# Patient Record
Sex: MALE
[De-identification: names, ages, dates, MRNs, and addresses within clinical notes are randomized per-mention and may not be internally consistent; named-entity substitution may affect disease eponyms.]

---

## 2022-07-11 PROBLEM — Z00.00 ENCOUNTER FOR PREVENTIVE HEALTH EXAMINATION: Status: ACTIVE | Noted: 2022-07-11

## 2022-07-14 ENCOUNTER — APPOINTMENT (OUTPATIENT)
Dept: SURGERY | Facility: CLINIC | Age: 47
End: 2022-07-14

## 2022-07-14 VITALS
DIASTOLIC BLOOD PRESSURE: 87 MMHG | SYSTOLIC BLOOD PRESSURE: 137 MMHG | WEIGHT: 222 LBS | HEART RATE: 82 BPM | HEIGHT: 63 IN | BODY MASS INDEX: 39.34 KG/M2

## 2022-07-14 VITALS — HEIGHT: 63.39 IN | BODY MASS INDEX: 27.47 KG/M2

## 2022-07-14 VITALS
HEIGHT: 63.39 IN | DIASTOLIC BLOOD PRESSURE: 73 MMHG | HEART RATE: 76 BPM | BODY MASS INDEX: 26.25 KG/M2 | SYSTOLIC BLOOD PRESSURE: 109 MMHG | WEIGHT: 150 LBS

## 2022-07-14 VITALS — WEIGHT: 157 LBS | BODY MASS INDEX: 27.81 KG/M2

## 2022-07-14 DIAGNOSIS — Z78.9 OTHER SPECIFIED HEALTH STATUS: ICD-10-CM

## 2022-07-14 PROCEDURE — 99203 OFFICE O/P NEW LOW 30 MIN: CPT

## 2022-07-14 RX ORDER — KETOCONAZOLE 20.5 MG/ML
2 SHAMPOO, SUSPENSION TOPICAL
Qty: 120 | Refills: 0 | Status: ACTIVE | COMMUNITY
Start: 2021-11-22

## 2022-07-14 RX ORDER — HYDROCORTISONE 25 MG/G
2.5 CREAM TOPICAL
Qty: 60 | Refills: 0 | Status: ACTIVE | COMMUNITY
Start: 2022-06-06

## 2022-07-14 RX ORDER — OMEPRAZOLE 40 MG/1
40 CAPSULE, DELAYED RELEASE ORAL
Qty: 30 | Refills: 0 | Status: ACTIVE | COMMUNITY
Start: 2022-01-24

## 2022-07-14 NOTE — PHYSICAL EXAM
[None] : there were no anal fissures seen [Normal] : was normal [___ Anterior] : a [unfilled] ~Ucm rectal mass was present anteriorly [6:00] : in the 6:00 position [Alert] : alert [Oriented to Person] : oriented to person [Oriented to Place] : oriented to place [Oriented to Time] : oriented to time [Calm] : calm [de-identified] : He  is alert, well-groomed, and in NAD\par   [de-identified] : anicteric.  Nasal mucosa pink, septum midline. Oral mucosa pink.  Tongue midline, Pharynx without exudates.\par   [de-identified] : Neck supple. Trachea midline. Thyroid isthmus barely palpable, lobes not felt.\par

## 2022-07-14 NOTE — CONSULT LETTER
[Dear  ___] : Dear  [unfilled], [Consult Letter:] : I had the pleasure of evaluating your patient, [unfilled]. [Please see my note below.] : Please see my note below. [Consult Closing:] : Thank you very much for allowing me to participate in the care of this patient.  If you have any questions, please do not hesitate to contact me. [Sincerely,] : Sincerely, [FreeTextEntry3] : Torin Ram MD, FACS

## 2022-07-14 NOTE — PLAN
[FreeTextEntry1] : Mr. SIMENTAL  was told significance of findings, options, risks and benefits were explained.  Informed consent for sigmoidoscopy and excision anal mass and potential risks, benefits and alternatives (surgical options were discussed including non-surgical options or the option of no surgery) to the planned surgery were discussed in depth.  All surgical options were discussed including non-surgical treatments.  He wishes to proceed with surgery.  We will plan for surgery on at the next available date, pending any required insurance pre-certification or pre-approval. He agrees to obtain any necessary pre-operative evaluations and testing prior to surgery.\par Patient advised to seek immediate medical attention with any acute change in symptoms or with the development of any new or worsening symptoms.  Patient's questions and concerns addressed to patient's satisfaction, and patient verbalized an understanding of the information discussed.\par \par

## 2022-07-14 NOTE — HISTORY OF PRESENT ILLNESS
[de-identified] : Mr. JORGE SIMENTAL  is 47 year  old  male referred by Dr Enrique Barnes with the chief complaint having  anal polyp.     Mr. SIMENTAL denies rectal pain, swelling, drainage or bleeding   from the rectum. He reports  any nausea, vomiting or fevers.   Patient reports good   appetite. Patient denies  weight loss or fatigue.  patient reports  good bowel movements .  A colonoscopy was  performed on 06/06/2022 and  revealed  anal polyp. Patient's pathology results were  consistent with condyloma with moderated squamous dysplasia . Mr. SIMENTAL  denies family history of Colon Ca.

## 2022-08-11 ENCOUNTER — APPOINTMENT (OUTPATIENT)
Dept: SURGERY | Facility: CLINIC | Age: 47
End: 2022-08-11

## 2022-08-11 VITALS
DIASTOLIC BLOOD PRESSURE: 68 MMHG | HEART RATE: 69 BPM | HEIGHT: 63 IN | WEIGHT: 152 LBS | SYSTOLIC BLOOD PRESSURE: 101 MMHG | BODY MASS INDEX: 26.93 KG/M2

## 2022-08-11 VITALS — TEMPERATURE: 97.1 F

## 2022-08-11 PROCEDURE — 99213 OFFICE O/P EST LOW 20 MIN: CPT | Mod: 25

## 2022-08-11 PROCEDURE — 45171 EXC RECT TUM TRANSANAL PART: CPT

## 2022-08-11 NOTE — ASSESSMENT
[FreeTextEntry1] : Mr. JORGE SIMENTAL is 47 year old male referred by Dr Enrique Barnes with the chief complaint having anal polyp.   A colonoscopy was performed on 06/06/2022 and revealed anal polyp. Patient's pathology results were consistent with condyloma with moderated squamous dysplasia.

## 2022-08-11 NOTE — PROCEDURE
[FreeTextEntry1] : Mr. SIMENTAL  was told significance of findings, options, risks and benefits were explained.  Informed consent for sigmoidoscopy, excision anal lesion and potential risks, benefits and alternatives (surgical options were discussed including non-surgical options or the option of no surgery) to the planned surgery were discussed in depth.  All surgical options were discussed including non-surgical treatments.  He wishes to proceed with surgery.   \par \par Procedure\par \par Preoperative diagnosis :  anal lesion \par Post operative diagnosis : same\par Procedure : excision   \par \par The area was cleaned and prepped. Lesion  was identified at the left anal verge. The lesion was excised.  stitches were applied and no bleeding observed.    Tolerated the procedure. Post op instructions given\par Specimen sent for pathology \par Patient advised to seek immediate medical attention with any acute change in symptoms or with the development of any new or worsening symptoms.  Patient's questions and concerns addressed to patient's satisfaction, and patient verbalized an understanding of the information discussed.\par \par

## 2022-08-16 PROBLEM — K62.0 ANAL POLYP: Status: ACTIVE | Noted: 2022-07-14

## 2022-08-18 ENCOUNTER — APPOINTMENT (OUTPATIENT)
Dept: SURGERY | Facility: CLINIC | Age: 47
End: 2022-08-18

## 2022-08-18 VITALS
HEART RATE: 68 BPM | BODY MASS INDEX: 26.75 KG/M2 | SYSTOLIC BLOOD PRESSURE: 113 MMHG | DIASTOLIC BLOOD PRESSURE: 73 MMHG | HEIGHT: 63 IN | WEIGHT: 151 LBS

## 2022-08-18 DIAGNOSIS — K62.0 ANAL POLYP: ICD-10-CM

## 2022-08-18 LAB — CORE LAB BIOPSY: NORMAL

## 2022-08-18 PROCEDURE — 99024 POSTOP FOLLOW-UP VISIT: CPT

## 2022-08-18 NOTE — DATA REVIEWED
[FreeTextEntry1] :  Patient:   JORGE SIMENTAL\par \par \par Accession:                             [id number]\par \par Collected Date/Time:                   8/11/2022 13:37 EDT\par Received Date/Time:                    8/12/2022 11:18 EDT\par \par Surgical Pathology Report - Auth (Verified)\par \par Specimen(s) Submitted\par 1  Anal lesion biopsy\par \par Final Diagnosis\par 1. Anal lesion, biopsy\par      - Condyloma acuminatum.\par      - Negative for high grade dysplasia and carcinoma.\par \par Verified by: Jordan Lau DO\par (Electronic Signature)\par Reported on: 08/16/22 08:02 EDT,  [facility], [address]\[place]  [place] [address]\par Phone: [phone]   Fax: [phone]\par _________________________________________________________________\par \par Clinical Information\par \par K62.0\par \par Gross Description\par Received in formalin labeled   "Anal lision"  is a 0.8 cm in greatest\par  dimension tan tag-like soft tissue fragment. Ink is applied to the\par  margin, the specimen is serially sectioned. Entirely in one cassette: 1A.\par \par Charlene Lugo 08/13/2022 06:42 AM\par \par Disclaimer\par In addition to other data that may appear on the specimen containers, all\par  labels have been inspected to confirm the presence of the patients name\par  and date of birth.\par \par Histological Processing Performed at [facility],\par  Department of Pathology, [address] [place] [address].\par

## 2022-08-18 NOTE — PLAN
[FreeTextEntry1] : Mr. SIMENTAL will follow up  if needed. Warning signs, follow up, and restrictions were discussed with the patient.

## 2022-08-18 NOTE — HISTORY OF PRESENT ILLNESS
[de-identified] : Mr. SIMENTAL  is s/p excision anal lesion on 08/11/2022. Patient's pathology results were  consistent with Condyloma acuminatum.  Negative for high grade dysplasia and carcinoma. Today  Mr. SIMENTAL offers no complaints. patient reports no fever or  chills. patient reports occasional discomfort in the surgical area.  His surgical incisions are healing well. No signs of inflammation, infection or exudate. patient reports good bowel movements and appetite.

## 2022-08-18 NOTE — PHYSICAL EXAM
[None] : there were no anal fissures seen [Normal] : was normal [___ Anterior] : a [unfilled] ~Ucm rectal mass was present anteriorly [6:00] : in the 6:00 position [Alert] : alert [Oriented to Person] : oriented to person [Oriented to Place] : oriented to place [Oriented to Time] : oriented to time [Calm] : calm [de-identified] : He  is alert, well-groomed, and in NAD\par   [de-identified] : anicteric.  Nasal mucosa pink, septum midline. Oral mucosa pink.  Tongue midline, Pharynx without exudates.\par   [de-identified] : Neck supple. Trachea midline. Thyroid isthmus barely palpable, lobes not felt.\par

## 2022-08-18 NOTE — ASSESSMENT
[FreeTextEntry1] : Mr. SIMENTAL is doing well, with excellent post-operative recovery. The surgical incision is healing well and as expected. There is no evidence of infection or complication, and patient is progressing as expected. Post-operative wound care, activity, restrictions and precautions reinforced.  Pathology results were discussed in details. Patient's questions and concerns addressed to patient's satisfaction.\par